# Patient Record
Sex: FEMALE | Race: OTHER | HISPANIC OR LATINO | URBAN - METROPOLITAN AREA
[De-identification: names, ages, dates, MRNs, and addresses within clinical notes are randomized per-mention and may not be internally consistent; named-entity substitution may affect disease eponyms.]

---

## 2019-02-14 ENCOUNTER — OFFICE (OUTPATIENT)
Dept: URBAN - METROPOLITAN AREA CLINIC 78 | Facility: CLINIC | Age: 49
End: 2019-02-14

## 2019-02-14 VITALS
TEMPERATURE: 98.6 F | DIASTOLIC BLOOD PRESSURE: 91 MMHG | WEIGHT: 151 LBS | HEIGHT: 62 IN | HEART RATE: 75 BPM | SYSTOLIC BLOOD PRESSURE: 136 MMHG

## 2019-02-14 DIAGNOSIS — R19.7 DIARRHEA, UNSPECIFIED: ICD-10-CM

## 2019-02-14 DIAGNOSIS — R10.84 GENERALIZED ABDOMINAL PAIN: ICD-10-CM

## 2019-02-14 DIAGNOSIS — R14.0 ABDOMINAL DISTENSION (GASEOUS): ICD-10-CM

## 2019-02-14 DIAGNOSIS — R12 HEARTBURN: ICD-10-CM

## 2019-02-14 PROCEDURE — 99243 OFF/OP CNSLTJ NEW/EST LOW 30: CPT

## 2019-02-14 RX ORDER — FAMOTIDINE 20 MG/1
TABLET, FILM COATED ORAL
Qty: 30 | Refills: 1 | Status: ACTIVE
Start: 2019-02-14

## 2019-02-14 NOTE — SERVICEHPINOTES
ALONSO GUILLEN   is a   48   year old    female originally from [facility] who is being seen in consultation at the request of   KAREN HUMPHREYS Dr.   for chronic digestive issues. Notes that symptoms started years ago. She has a lot of post-prandial bloating and intermittent diarrhea. Stools can be loose and yellow at times. She can also have hard pebble-like stools as well. Says she has had irregularity for many years was going to get further evaluation a couple of years ago but due to insurance changes, she did not. She does report a recent negative H pylori breath test ordered by her PCP. She can get crampy abdominal pains at times and does worse with meats and Chinese food. She has tried Nexium and Prilosec in the past which helped temporarily. She denies fevers, weight loss, blood in stools, black stools, dysphagia, N/V.